# Patient Record
Sex: MALE | Race: WHITE | NOT HISPANIC OR LATINO | Employment: UNEMPLOYED | URBAN - METROPOLITAN AREA
[De-identification: names, ages, dates, MRNs, and addresses within clinical notes are randomized per-mention and may not be internally consistent; named-entity substitution may affect disease eponyms.]

---

## 2021-08-10 ENCOUNTER — OFFICE VISIT (OUTPATIENT)
Dept: FAMILY MEDICINE CLINIC | Facility: CLINIC | Age: 36
End: 2021-08-10

## 2021-08-10 VITALS
SYSTOLIC BLOOD PRESSURE: 124 MMHG | HEIGHT: 74 IN | DIASTOLIC BLOOD PRESSURE: 82 MMHG | WEIGHT: 207.4 LBS | OXYGEN SATURATION: 98 % | HEART RATE: 86 BPM | BODY MASS INDEX: 26.62 KG/M2

## 2021-08-10 DIAGNOSIS — M53.3 COCCYX PAIN: ICD-10-CM

## 2021-08-10 DIAGNOSIS — Z23 NEED FOR VACCINATION: ICD-10-CM

## 2021-08-10 DIAGNOSIS — M54.42 ACUTE BILATERAL LOW BACK PAIN WITH LEFT-SIDED SCIATICA: ICD-10-CM

## 2021-08-10 DIAGNOSIS — E66.3 OVERWEIGHT (BMI 25.0-29.9): ICD-10-CM

## 2021-08-10 DIAGNOSIS — R73.9 HYPERGLYCEMIA: Primary | ICD-10-CM

## 2021-08-10 LAB
GLUCOSE BLDC GLUCOMTR-MCNC: 96 MG/DL (ref 70–130)
HBA1C MFR BLD: 5.2 %

## 2021-08-10 PROCEDURE — 83036 HEMOGLOBIN GLYCOSYLATED A1C: CPT | Performed by: FAMILY MEDICINE

## 2021-08-10 PROCEDURE — 99204 OFFICE O/P NEW MOD 45 MIN: CPT | Performed by: FAMILY MEDICINE

## 2021-08-10 PROCEDURE — 3044F HG A1C LEVEL LT 7.0%: CPT | Performed by: FAMILY MEDICINE

## 2021-08-10 RX ORDER — MULTIVIT WITH MINERALS/LUTEIN
1000 TABLET ORAL DAILY
COMMUNITY

## 2021-08-10 RX ORDER — MULTIPLE VITAMINS W/ MINERALS TAB 9MG-400MCG
1 TAB ORAL DAILY
COMMUNITY

## 2021-08-10 RX ORDER — BUSPIRONE HYDROCHLORIDE 10 MG/1
10 TABLET ORAL DAILY
COMMUNITY

## 2021-08-10 RX ORDER — CALCIUM CARBONATE 260MG(650)
TABLET,CHEWABLE ORAL
COMMUNITY

## 2021-08-10 RX ORDER — ERGOCALCIFEROL (VITAMIN D2) 10 MCG
400 TABLET ORAL DAILY
COMMUNITY

## 2021-08-10 NOTE — PROGRESS NOTES
Chief Complaint  Establish Care ([facility]. Pt states that he is concered and about his glucose levels and would like to get that checked. ), Tailbone Pain (Pt states that he has been having tailbone pain for about 3 months now. Pt states that he is unsure if its his tailbone or lower back. ), Foot Injury (Pt states that he has been having a tingly and foot pain for a while now. Pt states that he went to the ER last friday and was told everything looked okay but could be from Sciatica n), and Dizziness (Pt states that when he lays down he gets really dizzy. Pt states that this probably started around 2009. Pt is concered and woud like to discuss this today. )    Subjective          Ismael Major presents to [facility]  History of Present Illness     [facility]. 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 He has lower back issues with disc. Issues with both knees. He has short term recall and forgets a word. Headaches have resolved. He was in the [facility] in [facility] 4 days ago concern for COVID symptoms headache, loss of appetite, feeling sick. PCR was negative 6 days ago. Pins and needles left foot, worried about blood clot but ultrasound was normal. His glucose was high in the ED. FH father with diabetes. He has started dietary changes cutting out sugar and eating less to loose weight. He just graduated law school in 2020 and put on 20 lbs during school. He remodels his house and physically active daily. [facility], xray no fractures. L:ower back was straight as a board. He declined muscle relaxer.  Hurts to sit and worse with bowel movements. He has worse constipation. 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 He had sciatica in left side when he did more sitting while in law school.  "        Objective   Vital Signs:   /82   Pulse 86   Ht 188 cm (74\")   Wt 94.1 kg (207 lb 6.4 oz)   SpO2 98%   BMI 26.63 kg/m²     Physical Exam  Vitals reviewed.   Constitutional:       General: He is not in acute distress.     Appearance: He is not ill-appearing.   Cardiovascular:      Rate and Rhythm: Normal rate and regular rhythm.   Pulmonary:      Effort: Pulmonary effort is normal.      Breath sounds: Normal breath sounds.   Musculoskeletal:      Lumbar back: No spasms or bony tenderness. Negative right straight leg raise test and negative left straight leg raise test.   Neurological:      Mental Status: He is alert.        Result Review :                 Assessment and Plan    Diagnoses and all orders for this visit:    1. Hyperglycemia (Primary)  -     POC Glucose  -     POC Glycosylated Hemoglobin (Hb A1C)  POCT normal. Obtain outside records.   2. Overweight (BMI 25.0-29.9)  Patient's (Body mass index is 26.63 kg/m².) indicates that they are overweight (BMI 25-29.9) with health related conditions that include none . Weight is newly identified. BMI is is above average; BMI management plan is completed. We discussed low calorie, low carb based diet program and increasing exercise.     3. Coccyx pain  -     Cancel: Ambulatory Referral to Physical Therapy Evaluate and treat  Outside ED and last PCP records requested. Recommend PT. He will check at [facility] first. Defer MRI until xrays reviewed and evaluated by PT.   4. Acute bilateral low back pain with left-sided sciatica  -     Cancel: Ambulatory Referral to Physical Therapy Evaluate and treat  Outside ED and last PCP records requested. Recommend PT. He will check at [facility] first. Defer MRI until xrays reviewed and evaluated by PT.   5. Need for vaccination  I recommend COVID-19 vaccine.           Follow Up   Return if symptoms worsen or fail to improve.  Patient was given instructions and counseling regarding his condition or for health maintenance " advice. Please see specific information pulled into the AVS if appropriate.     Electronically signed by Elena Chinchilla MD, 08/10/21, 5:19 PM EDT.

## 2021-08-10 NOTE — PATIENT INSTRUCTIONS
Nutrition  • Meet your nutrient needs primarily through nutrition by consuming foods and not just supplements  • The Mediterranean diet and DASH (Dietary Approaches to Stop Hypertension) diet are proven diets to become healthier and lowering the risk of high blood pressure, some kinds of cancer, stroke, heart disease, heart failure, kidney stones, and diabetes. They are also effective at weight loss.  • Macronutrient targets % total calories : Carbohydrates 50% (45-65%),  fat 30% (20-35%),  protein 20% (10-35%).   • Emphasize vegetables, fruits, whole grains, nuts and seeds, beans and legumes, olive oil, and drink water. Small amounts of dairy, fish and seafood, and lean meat such as poultry. Occasional beef, pork, lamb, sweets and processed foods such as baked goods, chips, crackers, chocolate and candy.   • Frozen vegetables and fruit are minimally processed , picked at its peak, convenient, and helps reduce food waste  • Try low-sodium canned tomatoes, beans, and vegetables. You can also rinse in water to help remove some sodium.   • Canned fruits packed in fruit juice is a better option than heavy syrup.   • Recommend whole fruits over juice. Dried fruits are ok but have a higher sugar content.   • Eat the rainbow. Vary your veggies with dark green, red and orange colors.   • Make half your grains whole grains. Oatmeal brown rice, quinoa, farro, whole-grain pasta, whole-grain bread, and whole-grain tortillas.   • Include a variety of protein sources such as lean meats, poultry, fish, seafood, beans, peas, nuts, seeds, eggs, soy   • Move to low-fat or fat-free milk or yogurt. Limit cheese.   • Other products sold as “milks” made from plants, such as rice, almond, coconut, and hemp “milks,” may contain calcium, but are not included in the dairy group because their overall nutrient content is not similar to dairy milk and fortified soy beverages   • Use oils like canola, olive, and others instead of solid fats  (like butter and stick margarine, shortening, lard, and coconut oil)  o Try grilling, broiling, roasting, or baking--they don't add extra fat  • Added sugars include syrups and other sweeteners (brown sugar, corn sweetener, corn syrup, dextrose, fructose, glucose, high fructose corn syrup, honey, invert sugar, lactose, malt syrup, maltose, molasses, raw sugar, sucrose, trehalose, and turbinado sugar). When sugars are added to foods to red them, they add calories without contributing essential nutrients  • Cut calories by drinking water or unsweetened beverages. Soda, energy drinks, and sports drinks are a major source of added sugars  • Water intake of 1.5L - 2L (50-70 ounces) per day  • Daily fiber intake 20 g to 35 g per day  • Soluble fiber pulls in water to slow solidify and slow loose, watery stools plus lower cholesterol. Examples are oats, peas, beans, apples, citrus fruits, carrots, barley and psyllium   • Insoluble fiber is “roughage” to add bulk, make stool easier to pass and helps constipation. Examples are whole-wheat flour, wheat bran, nuts, beans and vegetables, such as cauliflower, green beans and potatoes.   •